# Patient Record
Sex: FEMALE
[De-identification: names, ages, dates, MRNs, and addresses within clinical notes are randomized per-mention and may not be internally consistent; named-entity substitution may affect disease eponyms.]

---

## 2017-05-23 ENCOUNTER — HOSPITAL ENCOUNTER (EMERGENCY)
Dept: HOSPITAL 5 - ED | Age: 42
LOS: 1 days | Discharge: HOME | End: 2017-05-24
Payer: MEDICARE
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
## 2017-05-23 DIAGNOSIS — F32.9: ICD-10-CM

## 2017-05-23 DIAGNOSIS — S96.912A: Primary | ICD-10-CM

## 2017-05-23 DIAGNOSIS — J45.909: ICD-10-CM

## 2017-05-23 DIAGNOSIS — F31.9: ICD-10-CM
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
## 2017-05-23 DIAGNOSIS — S80.02XA: ICD-10-CM
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
## 2017-05-23 LAB
ANION GAP SERPL CALC-SCNC: 17 MMOL/L
APTT BLD: 24.7 SEC. (ref 24.2–36.6)
BUN SERPL-MCNC: 16 MG/DL (ref 7–17)
BUN/CREAT SERPL: 22.85 %
CALCIUM SERPL-MCNC: 9.8 MG/DL (ref 8.4–10.2)
CHLORIDE SERPL-SCNC: 100.6 MMOL/L (ref 98–107)
CO2 SERPL-SCNC: 24 MMOL/L (ref 22–30)
GLUCOSE SERPL-MCNC: 95 MG/DL (ref 65–100)
HCT VFR BLD CALC: 40.9 % (ref 30.3–42.9)
HGB BLD-MCNC: 13.6 GM/DL (ref 10.1–14.3)
INR PPP: 1.01 (ref 0.87–1.13)
MCH RBC QN AUTO: 32 PG (ref 28–32)
MCHC RBC AUTO-ENTMCNC: 33 % (ref 30–34)
MCV RBC AUTO: 96 FL (ref 79–97)
PLATELET # BLD: 230 K/MM3 (ref 140–440)
POTASSIUM SERPL-SCNC: 4 MMOL/L (ref 3.6–5)
RBC # BLD AUTO: 4.27 M/MM3 (ref 3.65–5.03)
SODIUM SERPL-SCNC: 138 MMOL/L (ref 137–145)
WBC # BLD AUTO: 8 K/MM3 (ref 4.5–11)

## 2017-05-23 PROCEDURE — 36415 COLL VENOUS BLD VENIPUNCTURE: CPT

## 2017-05-23 PROCEDURE — 85730 THROMBOPLASTIN TIME PARTIAL: CPT

## 2017-05-23 PROCEDURE — 85610 PROTHROMBIN TIME: CPT

## 2017-05-23 PROCEDURE — 99284 EMERGENCY DEPT VISIT MOD MDM: CPT

## 2017-05-23 PROCEDURE — 84703 CHORIONIC GONADOTROPIN ASSAY: CPT

## 2017-05-23 PROCEDURE — 80048 BASIC METABOLIC PNL TOTAL CA: CPT

## 2017-05-23 PROCEDURE — 85027 COMPLETE CBC AUTOMATED: CPT

## 2017-05-24 VITALS — DIASTOLIC BLOOD PRESSURE: 52 MMHG | SYSTOLIC BLOOD PRESSURE: 120 MMHG

## 2017-09-25 ENCOUNTER — HOSPITAL ENCOUNTER (EMERGENCY)
Dept: HOSPITAL 5 - ED | Age: 42
LOS: 1 days | Discharge: HOME | End: 2017-09-26
Payer: MEDICARE

## 2017-09-25 DIAGNOSIS — Z88.5: ICD-10-CM

## 2017-09-25 DIAGNOSIS — B35.1: Primary | ICD-10-CM

## 2017-09-25 DIAGNOSIS — F31.9: ICD-10-CM

## 2017-09-25 DIAGNOSIS — J45.909: ICD-10-CM

## 2017-09-25 PROCEDURE — 99282 EMERGENCY DEPT VISIT SF MDM: CPT

## 2017-09-26 VITALS — DIASTOLIC BLOOD PRESSURE: 55 MMHG | SYSTOLIC BLOOD PRESSURE: 114 MMHG

## 2017-10-03 ENCOUNTER — HOSPITAL ENCOUNTER (EMERGENCY)
Dept: HOSPITAL 5 - ED | Age: 42
Discharge: HOME | End: 2017-10-03
Payer: MEDICARE

## 2017-10-03 VITALS — DIASTOLIC BLOOD PRESSURE: 75 MMHG | SYSTOLIC BLOOD PRESSURE: 107 MMHG
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
## 2017-10-03 DIAGNOSIS — S93.432A: ICD-10-CM

## 2017-10-03 DIAGNOSIS — S83.8X2A: Primary | ICD-10-CM
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
## 2017-10-13 ENCOUNTER — HOSPITAL ENCOUNTER (EMERGENCY)
Dept: HOSPITAL 5 - ED | Age: 42
LOS: 1 days | Discharge: HOME | End: 2017-10-14
Payer: MEDICARE

## 2017-10-13 DIAGNOSIS — R55: Primary | ICD-10-CM

## 2017-10-13 DIAGNOSIS — F31.9: ICD-10-CM

## 2017-10-13 DIAGNOSIS — F32.9: ICD-10-CM

## 2017-10-13 DIAGNOSIS — J45.909: ICD-10-CM

## 2017-10-13 DIAGNOSIS — Z88.5: ICD-10-CM

## 2017-10-13 DIAGNOSIS — G44.209: ICD-10-CM

## 2017-10-13 PROCEDURE — 93005 ELECTROCARDIOGRAM TRACING: CPT

## 2017-10-13 PROCEDURE — 93010 ELECTROCARDIOGRAM REPORT: CPT

## 2017-10-14 VITALS — DIASTOLIC BLOOD PRESSURE: 73 MMHG | SYSTOLIC BLOOD PRESSURE: 132 MMHG

## 2017-10-17 ENCOUNTER — HOSPITAL ENCOUNTER (EMERGENCY)
Dept: HOSPITAL 5 - ED | Age: 42
Discharge: HOME | End: 2017-10-17
Payer: MEDICARE

## 2017-10-17 VITALS — DIASTOLIC BLOOD PRESSURE: 63 MMHG | SYSTOLIC BLOOD PRESSURE: 111 MMHG

## 2017-10-17 DIAGNOSIS — Z98.890: ICD-10-CM

## 2017-10-17 DIAGNOSIS — M54.10: ICD-10-CM

## 2017-10-17 DIAGNOSIS — J45.909: ICD-10-CM

## 2017-10-17 DIAGNOSIS — G89.29: ICD-10-CM

## 2017-10-17 DIAGNOSIS — M25.511: Primary | ICD-10-CM

## 2017-10-17 PROCEDURE — 96372 THER/PROPH/DIAG INJ SC/IM: CPT

## 2017-10-17 PROCEDURE — 73090 X-RAY EXAM OF FOREARM: CPT

## 2017-10-17 PROCEDURE — 99283 EMERGENCY DEPT VISIT LOW MDM: CPT

## 2017-10-17 PROCEDURE — 73030 X-RAY EXAM OF SHOULDER: CPT

## 2017-10-17 RX ADMIN — KETOROLAC TROMETHAMINE ONE MG: 60 INJECTION, SOLUTION INTRAMUSCULAR at 11:21

## 2017-11-18 ENCOUNTER — HOSPITAL ENCOUNTER (EMERGENCY)
Dept: HOSPITAL 5 - ED | Age: 42
Discharge: HOME | End: 2017-11-18
Payer: MEDICARE

## 2017-11-18 VITALS — DIASTOLIC BLOOD PRESSURE: 73 MMHG | SYSTOLIC BLOOD PRESSURE: 114 MMHG

## 2017-11-18 DIAGNOSIS — Z88.6: ICD-10-CM

## 2017-11-18 DIAGNOSIS — S93.402A: Primary | ICD-10-CM
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
## 2017-11-18 PROCEDURE — 99284 EMERGENCY DEPT VISIT MOD MDM: CPT

## 2018-02-14 ENCOUNTER — HOSPITAL ENCOUNTER (EMERGENCY)
Dept: HOSPITAL 5 - ED | Age: 43
Discharge: LEFT BEFORE DISCHARGE | End: 2018-02-14
Payer: MEDICARE

## 2018-02-14 DIAGNOSIS — Z53.21: ICD-10-CM

## 2018-02-14 DIAGNOSIS — H92.09: Primary | ICD-10-CM

## 2018-02-17 ENCOUNTER — HOSPITAL ENCOUNTER (EMERGENCY)
Dept: HOSPITAL 5 - ED | Age: 43
Discharge: HOME | End: 2018-02-17
Payer: MEDICARE

## 2018-02-17 VITALS — DIASTOLIC BLOOD PRESSURE: 43 MMHG | SYSTOLIC BLOOD PRESSURE: 108 MMHG
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
## 2018-02-17 DIAGNOSIS — Z88.6: ICD-10-CM
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
## 2018-02-17 DIAGNOSIS — S93.402A: Primary | ICD-10-CM
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
## 2018-02-17 DIAGNOSIS — S83.92XA: ICD-10-CM

## 2018-02-17 PROCEDURE — 99283 EMERGENCY DEPT VISIT LOW MDM: CPT

## 2018-03-03 ENCOUNTER — HOSPITAL ENCOUNTER (EMERGENCY)
Dept: HOSPITAL 5 - ED | Age: 43
LOS: 1 days | Discharge: HOME | End: 2018-03-04
Payer: MEDICARE

## 2018-03-03 DIAGNOSIS — M25.531: Primary | ICD-10-CM

## 2018-03-03 PROCEDURE — 99284 EMERGENCY DEPT VISIT MOD MDM: CPT

## 2018-03-04 VITALS — SYSTOLIC BLOOD PRESSURE: 110 MMHG | DIASTOLIC BLOOD PRESSURE: 59 MMHG

## 2018-03-10 ENCOUNTER — HOSPITAL ENCOUNTER (EMERGENCY)
Dept: HOSPITAL 5 - ED | Age: 43
Discharge: HOME | End: 2018-03-10
Payer: MEDICARE

## 2018-03-10 VITALS — SYSTOLIC BLOOD PRESSURE: 110 MMHG | DIASTOLIC BLOOD PRESSURE: 65 MMHG
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
## 2018-03-10 DIAGNOSIS — Z88.6: ICD-10-CM
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
## 2018-03-10 DIAGNOSIS — S93.402A: Primary | ICD-10-CM

## 2018-03-10 LAB
ALBUMIN SERPL-MCNC: 3.8 G/DL (ref 3.9–5)
ALT SERPL-CCNC: 15 UNITS/L (ref 7–56)
BASOPHILS # (AUTO): 0.1 K/MM3 (ref 0–0.1)
BASOPHILS NFR BLD AUTO: 0.8 % (ref 0–1.8)
BUN SERPL-MCNC: 12 MG/DL (ref 7–17)
BUN/CREAT SERPL: 17 %
CALCIUM SERPL-MCNC: 9.6 MG/DL (ref 8.4–10.2)
EOSINOPHIL # BLD AUTO: 0.2 K/MM3 (ref 0–0.4)
EOSINOPHIL NFR BLD AUTO: 2.8 % (ref 0–4.3)
HCT VFR BLD CALC: 39.1 % (ref 30.3–42.9)
HEMOLYSIS INDEX: 4
HGB BLD-MCNC: 13.4 GM/DL (ref 10.1–14.3)
LIPASE SERPL-CCNC: 42 UNITS/L (ref 13–60)
LYMPHOCYTES # BLD AUTO: 2.5 K/MM3 (ref 1.2–5.4)
LYMPHOCYTES NFR BLD AUTO: 35.8 % (ref 13.4–35)
MCH RBC QN AUTO: 32 PG (ref 28–32)
MCHC RBC AUTO-ENTMCNC: 34 % (ref 30–34)
MCV RBC AUTO: 95 FL (ref 79–97)
MONOCYTES # (AUTO): 0.3 K/MM3 (ref 0–0.8)
MONOCYTES % (AUTO): 4.2 % (ref 0–7.3)
PLATELET # BLD: 217 K/MM3 (ref 140–440)
RBC # BLD AUTO: 4.14 M/MM3 (ref 3.65–5.03)

## 2018-03-10 PROCEDURE — 82150 ASSAY OF AMYLASE: CPT

## 2018-03-10 PROCEDURE — 36415 COLL VENOUS BLD VENIPUNCTURE: CPT

## 2018-03-10 PROCEDURE — 80053 COMPREHEN METABOLIC PANEL: CPT

## 2018-03-10 PROCEDURE — 83690 ASSAY OF LIPASE: CPT

## 2018-03-10 PROCEDURE — 85025 COMPLETE CBC W/AUTO DIFF WBC: CPT

## 2018-03-10 PROCEDURE — 99283 EMERGENCY DEPT VISIT LOW MDM: CPT

## 2018-03-10 PROCEDURE — 96372 THER/PROPH/DIAG INJ SC/IM: CPT

## 2018-03-16 ENCOUNTER — HOSPITAL ENCOUNTER (EMERGENCY)
Dept: HOSPITAL 5 - ED | Age: 43
Discharge: HOME | End: 2018-03-16
Payer: MEDICARE

## 2018-03-16 VITALS — SYSTOLIC BLOOD PRESSURE: 119 MMHG | DIASTOLIC BLOOD PRESSURE: 73 MMHG

## 2018-03-16 DIAGNOSIS — M25.511: Primary | ICD-10-CM

## 2018-03-16 PROCEDURE — 99283 EMERGENCY DEPT VISIT LOW MDM: CPT

## 2018-03-19 ENCOUNTER — HOSPITAL ENCOUNTER (EMERGENCY)
Dept: HOSPITAL 5 - ED | Age: 43
Discharge: HOME | End: 2018-03-19
Payer: MEDICARE

## 2018-03-19 VITALS — DIASTOLIC BLOOD PRESSURE: 69 MMHG | SYSTOLIC BLOOD PRESSURE: 128 MMHG

## 2018-03-19 DIAGNOSIS — G43.909: Primary | ICD-10-CM

## 2018-03-19 DIAGNOSIS — Z88.6: ICD-10-CM

## 2018-03-19 PROCEDURE — 96375 TX/PRO/DX INJ NEW DRUG ADDON: CPT

## 2018-03-19 PROCEDURE — 99283 EMERGENCY DEPT VISIT LOW MDM: CPT

## 2018-03-19 PROCEDURE — 96374 THER/PROPH/DIAG INJ IV PUSH: CPT

## 2018-03-19 PROCEDURE — 96361 HYDRATE IV INFUSION ADD-ON: CPT

## 2018-03-20 ENCOUNTER — HOSPITAL ENCOUNTER (EMERGENCY)
Dept: HOSPITAL 5 - ED | Age: 43
Discharge: LEFT BEFORE DISCHARGE | End: 2018-03-20
Payer: MEDICARE

## 2018-03-20 VITALS — DIASTOLIC BLOOD PRESSURE: 52 MMHG | SYSTOLIC BLOOD PRESSURE: 116 MMHG

## 2018-03-20 DIAGNOSIS — J02.9: Primary | ICD-10-CM

## 2018-03-20 DIAGNOSIS — M79.606: ICD-10-CM

## 2018-03-20 DIAGNOSIS — Z53.21: ICD-10-CM

## 2018-03-28 ENCOUNTER — HOSPITAL ENCOUNTER (EMERGENCY)
Dept: HOSPITAL 5 - ED | Age: 43
LOS: 1 days | Discharge: HOME | End: 2018-03-29
Payer: MEDICARE

## 2018-03-28 VITALS — DIASTOLIC BLOOD PRESSURE: 82 MMHG | SYSTOLIC BLOOD PRESSURE: 126 MMHG
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
## 2018-03-28 DIAGNOSIS — Z88.6: ICD-10-CM

## 2018-03-28 DIAGNOSIS — S80.02XA: Primary | ICD-10-CM
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
## 2018-03-28 PROCEDURE — 99283 EMERGENCY DEPT VISIT LOW MDM: CPT

## 2018-04-07 ENCOUNTER — HOSPITAL ENCOUNTER (EMERGENCY)
Dept: HOSPITAL 5 - ED | Age: 43
Discharge: LEFT BEFORE DISCHARGE | End: 2018-04-07
Payer: MEDICARE

## 2018-04-07 VITALS — DIASTOLIC BLOOD PRESSURE: 65 MMHG | SYSTOLIC BLOOD PRESSURE: 110 MMHG

## 2018-04-07 DIAGNOSIS — Z53.21: ICD-10-CM

## 2018-04-07 DIAGNOSIS — M79.602: Primary | ICD-10-CM

## 2018-04-07 DIAGNOSIS — M79.605: ICD-10-CM

## 2018-04-07 PROCEDURE — 93010 ELECTROCARDIOGRAM REPORT: CPT

## 2018-04-07 PROCEDURE — 93005 ELECTROCARDIOGRAM TRACING: CPT

## 2018-04-25 ENCOUNTER — HOSPITAL ENCOUNTER (EMERGENCY)
Dept: HOSPITAL 5 - ED | Age: 43
Discharge: HOME | End: 2018-04-25
Payer: MEDICARE

## 2018-04-25 VITALS — DIASTOLIC BLOOD PRESSURE: 50 MMHG | SYSTOLIC BLOOD PRESSURE: 121 MMHG

## 2018-04-25 DIAGNOSIS — Z90.721: ICD-10-CM
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
## 2018-04-25 DIAGNOSIS — F31.9: ICD-10-CM

## 2018-04-25 DIAGNOSIS — Z88.5: ICD-10-CM

## 2018-04-25 DIAGNOSIS — Z90.49: ICD-10-CM

## 2018-04-25 DIAGNOSIS — F43.10: ICD-10-CM

## 2018-04-25 DIAGNOSIS — M25.572: ICD-10-CM

## 2018-04-25 DIAGNOSIS — M25.562: Primary | ICD-10-CM

## 2018-04-25 DIAGNOSIS — G89.29: ICD-10-CM

## 2018-04-25 PROCEDURE — 99283 EMERGENCY DEPT VISIT LOW MDM: CPT

## 2018-05-05 ENCOUNTER — HOSPITAL ENCOUNTER (EMERGENCY)
Dept: HOSPITAL 5 - ED | Age: 43
Discharge: HOME | End: 2018-05-05
Payer: MEDICARE

## 2018-05-05 VITALS — SYSTOLIC BLOOD PRESSURE: 112 MMHG | DIASTOLIC BLOOD PRESSURE: 72 MMHG
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
## 2018-05-05 DIAGNOSIS — G89.29: ICD-10-CM

## 2018-05-05 DIAGNOSIS — Z88.8: ICD-10-CM
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
## 2018-05-05 DIAGNOSIS — M25.562: ICD-10-CM

## 2018-05-05 DIAGNOSIS — M79.601: Primary | ICD-10-CM
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
## 2018-05-05 PROCEDURE — 99283 EMERGENCY DEPT VISIT LOW MDM: CPT

## 2018-05-05 NOTE — XRAY REPORT
FINAL REPORT



EXAM:  XR TIBIA FIBULA 2V LT



HISTORY:  left leg pain 



TECHNIQUE:  



PRIORS:  None.



FINDINGS:  

No fracture is identified. The joint spaces are within normal

limits. No focal bony lesion identified. No radiopaque foreign

body seen. 



IMPRESSION:  

Negative no acute abnormality.

## 2018-05-05 NOTE — XRAY REPORT
FINAL REPORT



EXAM:  XR HUMERUS 2+V RT



HISTORY:  Right arm pain 



TECHNIQUE:  Right humerus AP and lateral views 



PRIORS:  None.



FINDINGS:  

No fracture is identified. The joint spaces are within normal

limits. No focal bony lesion identified. No radiopaque foreign

body seen. 



IMPRESSION:  

Negative no acute abnormality.

## 2018-05-05 NOTE — EMERGENCY DEPARTMENT REPORT
ED General Adult HPI





- General


Chief complaint: Extremity Injury, Lower


Stated complaint: FALL


Time Seen by Provider: 05/05/18 17:22


Source: patient


Mode of arrival: Ambulatory


Limitations: No Limitations





- History of Present Illness


Initial comments: 





42-year-old  female presents the emergency department with the 

complaint of pain to the entire right arm and to the left leg from the knee 

down to the ankle.  This occurred after the patient fell down some stairs by 

the quick trip.  She denies hitting her head or any loss of consciousness.  She 

is not taking anything for her symptoms prior to presentation.  She has some 

trouble bearing weight to the left leg secondary to pain in the ankle and knee.





Severity scale (0 -10): 4





- Related Data


 Previous Rx's











 Medication  Instructions  Recorded  Last Taken  Type


 


Ibuprofen [Motrin 800 MG tab] 800 mg PO Q8HR PRN #90 tablet 05/05/18 Unknown Rx











 Allergies











Allergy/AdvReac Type Severity Reaction Status Date / Time


 


codeine Allergy  Nausea Verified 05/05/18 20:10














ED Review of Systems


ROS: 


Stated complaint: FALL


Other details as noted in HPI








ED Past Medical Hx





- Past Medical History


Previous Medical History?: No





- Social History


Smoking Status: Never Smoker





- Medications


Home Medications: 


 Home Medications











 Medication  Instructions  Recorded  Confirmed  Last Taken  Type


 


Ibuprofen [Motrin 800 MG tab] 800 mg PO Q8HR PRN #90 tablet 05/05/18  Unknown Rx














ED Physical Exam





- General


Limitations: No Limitations


General appearance: alert, in no apparent distress





- Head


Head exam: Present: atraumatic, normocephalic





- Eye


Eye exam: Present: normal appearance





- ENT


ENT exam: Present: mucous membranes moist





- Neck


Neck exam: Present: normal inspection





- Respiratory


Respiratory exam: Present: normal lung sounds bilaterally.  Absent: respiratory 

distress





- Cardiovascular


Cardiovascular Exam: Present: regular rate, normal rhythm.  Absent: systolic 

murmur, diastolic murmur, rubs, gallop





- GI/Abdominal


GI/Abdominal exam: Present: soft, normal bowel sounds





- Extremities Exam


Extremities exam: Present: normal inspection





- Back Exam


Back exam: Present: normal inspection





- Neurological Exam


Neurological exam: Present: alert, oriented X3





- Psychiatric


Psychiatric exam: Present: normal affect, normal mood





- Skin


Skin exam: Present: warm, dry, intact, normal color.  Absent: rash





ED Course


 Vital Signs











  05/05/18





  15:34


 


Temperature 98 F


 


Pulse Rate 85


 


Respiratory 16





Rate 


 


Blood Pressure 110/71


 


O2 Sat by Pulse 96





Oximetry 














ED Medical Decision Making





- Radiology Data


Radiology results: report reviewed, image reviewed





FINAL REPORT 





EXAM: XR TIBIA FIBULA 2V LT 





HISTORY: left leg pain 





TECHNIQUE: 





PRIORS: None. 





FINDINGS: 


No fracture is identified. The joint spaces are within normal 


limits. No focal bony lesion identified. No radiopaque foreign 


body seen. 





IMPRESSION: 


Negative no acute abnormality. 











Transcribed By: SHEREE 


Dictated By: NAHUN GEIGER MD 


Electronically Authenticated By: NAHUN GEIGER MD 


Signed Date/Time: 05/05/18 1928 











DD/DT: 05/05/18 1928 


TD/TT: 05/05/18 1928





FINAL REPORT 





EXAM: XR KNEE 3V LT 





HISTORY: left knee pain 





TECHNIQUE: Left tibia-fibula AP and lateral views 





PRIORS: None. 





FINDINGS: 


No fracture is identified. The joint spaces are within normal 


limits. No focal bony lesion identified. No radiopaque foreign 


body seen. 





IMPRESSION: 


Negative no acute abnormality. 











Transcribed By: SHEREE 


Dictated By: NAHUN GEIGER MD 


Electronically Authenticated By: NAHUN GEIGER MD 


Signed Date/Time: 05/05/18 1931 











DD/DT: 05/05/18 1931 


TD/TT: 05/05/18 1931











FINAL REPORT 





EXAM: XR HUMERUS 2+V RT 





HISTORY: Right arm pain 





TECHNIQUE: Right humerus AP and lateral views 





PRIORS: None. 





FINDINGS: 


No fracture is identified. The joint spaces are within normal 


limits. No focal bony lesion identified. No radiopaque foreign 


body seen. 





IMPRESSION: 


Negative no acute abnormality. 











Transcribed By: SHEREE 


Dictated By: NAHUN GEIGER MD 


Electronically Authenticated By: NAHUN GEIGER MD 


Signed Date/Time: 05/05/18 1941 











DD/DT: 05/05/18 1941 


TD/TT: 05/05/18 1941


Critical care attestation.: 


If time is entered above; I have spent that time in minutes in the direct care 

of this critically ill patient, excluding procedure time.








ED Disposition


Clinical Impression: 


 Right arm pain





Chronic knee pain


Qualifiers:


 Laterality: left Qualified Code(s): M25.562 - Pain in left knee





Disposition: DC-01 TO HOME OR SELFCARE


Is pt being admited?: No


Does the pt Need Aspirin: No


Condition: Stable


Additional Instructions: 


Please take pain medication as prescribed.  It is important for you to follow 

up with a regular primary care provider to manage her chronic pain.


Prescriptions: 


Ibuprofen [Motrin 800 MG tab] 800 mg PO Q8HR PRN #90 tablet


 PRN Reason: Pain


Referrals: 


PRIMARY CARE,MD [Primary Care Provider] - 3-5 Days


[facility] [Provider Group] - 3-5 Days

## 2018-05-05 NOTE — XRAY REPORT
FINAL REPORT



EXAM:  XR FOREARM RT



HISTORY:  right forearm pain 



TECHNIQUE:  Right forearm two views 



PRIORS:  None.



FINDINGS:  

No fracture is identified. The joint spaces are within normal

limits. No focal bony lesion identified. No radiopaque foreign

body seen. 



IMPRESSION:  

Negative no acute abnormality.

## 2018-05-05 NOTE — XRAY REPORT
FINAL REPORT



EXAM:  XR KNEE 3V LT



HISTORY:  left knee pain 



TECHNIQUE:  Left tibia-fibula AP and lateral views 



PRIORS:  None.



FINDINGS:  

No fracture is identified. The joint spaces are within normal

limits. No focal bony lesion identified. No radiopaque foreign

body seen. 



IMPRESSION:  

Negative no acute abnormality.

## 2018-05-05 NOTE — EMERGENCY DEPARTMENT REPORT
Chief Complaint: Extremity Injury, Lower


Stated Complaint: FALL


Time Seen by Provider: 05/05/18 17:22





- HPI


History of Present Illness: 





42-year-old  female presents the emergency department with the 

complaint of pain to the entire right arm and to the left leg from the knee 

down to the ankle.  This occurred after the patient fell down some stairs by 

the quick trip.  She denies hitting her head or any loss of consciousness.  She 

is not taking anything for her symptoms prior to presentation.  She has some 

trouble bearing weight to the left leg secondary to pain in the ankle and knee.





- ROS


Review of Systems: 


Positive for right arm and left leg pain.  


Negative for headache, chest pain, shortness of breath, back pain, neck pain








- Exam


Vital Signs: 


 Vital Signs











  05/05/18





  15:34


 


Temperature 98 F


 


Pulse Rate 85


 


Respiratory 16





Rate 


 


Blood Pressure 110/71


 


O2 Sat by Pulse 96





Oximetry 











Physical Exam: 


Heart and lungs sounds are normal to auscultation.  Patient is awake and alert.

  She has some tenderness to palpation along the right arm from the shoulder to 

the wrist and along the left leg from the knee down to the ankle.  Negative 

anterior and posterior drawer test.  No laxity with valgus or varus stress.





MSE screening note: 


Focused history and physical exam performed.


Due to findings the following was ordered:





I have ordered x-rays of the right humerus, right forearm, left knee and left 

tib-fib.





ED Disposition for MSE


Condition: Stable


Referrals: 


PRIMARY CARE,MD [Primary Care Provider] - 3-5 Days

## 2018-05-08 ENCOUNTER — HOSPITAL ENCOUNTER (EMERGENCY)
Dept: HOSPITAL 5 - ED | Age: 43
Discharge: HOME | End: 2018-05-08
Payer: MEDICARE

## 2018-05-08 VITALS — SYSTOLIC BLOOD PRESSURE: 126 MMHG | DIASTOLIC BLOOD PRESSURE: 74 MMHG

## 2018-05-08 DIAGNOSIS — M25.562: ICD-10-CM

## 2018-05-08 DIAGNOSIS — Z90.49: ICD-10-CM

## 2018-05-08 DIAGNOSIS — N39.0: ICD-10-CM

## 2018-05-08 DIAGNOSIS — Z90.721: ICD-10-CM

## 2018-05-08 DIAGNOSIS — J45.909: ICD-10-CM

## 2018-05-08 DIAGNOSIS — Z88.5: ICD-10-CM

## 2018-05-08 DIAGNOSIS — G89.29: ICD-10-CM

## 2018-05-08 DIAGNOSIS — Z85.43: ICD-10-CM

## 2018-05-08 DIAGNOSIS — F31.9: ICD-10-CM

## 2018-05-08 DIAGNOSIS — F43.10: ICD-10-CM

## 2018-05-08 DIAGNOSIS — K52.9: Primary | ICD-10-CM

## 2018-05-08 LAB
ALBUMIN SERPL-MCNC: 3.8 G/DL (ref 3.9–5)
ALT SERPL-CCNC: 17 UNITS/L (ref 7–56)
BACTERIA #/AREA URNS HPF: (no result) /HPF
BASOPHILS # (AUTO): 0.1 K/MM3 (ref 0–0.1)
BASOPHILS NFR BLD AUTO: 0.9 % (ref 0–1.8)
BILIRUB UR QL STRIP: (no result)
BLOOD UR QL VISUAL: (no result)
BUN SERPL-MCNC: 13 MG/DL (ref 7–17)
BUN/CREAT SERPL: 22 %
CALCIUM SERPL-MCNC: 9.6 MG/DL (ref 8.4–10.2)
EOSINOPHIL # BLD AUTO: 0.2 K/MM3 (ref 0–0.4)
EOSINOPHIL NFR BLD AUTO: 2.2 % (ref 0–4.3)
HCT VFR BLD CALC: 38.5 % (ref 30.3–42.9)
HEMOLYSIS INDEX: 6
HGB BLD-MCNC: 12.5 GM/DL (ref 10.1–14.3)
LIPASE SERPL-CCNC: 32 UNITS/L (ref 13–60)
LYMPHOCYTES # BLD AUTO: 2.5 K/MM3 (ref 1.2–5.4)
LYMPHOCYTES NFR BLD AUTO: 31.6 % (ref 13.4–35)
MCH RBC QN AUTO: 32 PG (ref 28–32)
MCHC RBC AUTO-ENTMCNC: 33 % (ref 30–34)
MCV RBC AUTO: 98 FL (ref 79–97)
MONOCYTES # (AUTO): 0.4 K/MM3 (ref 0–0.8)
MONOCYTES % (AUTO): 5.4 % (ref 0–7.3)
MUCOUS THREADS #/AREA URNS HPF: (no result) /HPF
PH UR STRIP: 5 [PH] (ref 5–7)
PLATELET # BLD: 261 K/MM3 (ref 140–440)
PROT UR STRIP-MCNC: (no result) MG/DL
RBC # BLD AUTO: 3.94 M/MM3 (ref 3.65–5.03)
RBC #/AREA URNS HPF: 7 /HPF (ref 0–6)
UROBILINOGEN UR-MCNC: 2 MG/DL (ref ?–2)
WBC #/AREA URNS HPF: 6 /HPF (ref 0–6)

## 2018-05-08 PROCEDURE — 81001 URINALYSIS AUTO W/SCOPE: CPT

## 2018-05-08 PROCEDURE — 99283 EMERGENCY DEPT VISIT LOW MDM: CPT

## 2018-05-08 PROCEDURE — 80053 COMPREHEN METABOLIC PANEL: CPT

## 2018-05-08 PROCEDURE — 81025 URINE PREGNANCY TEST: CPT

## 2018-05-08 PROCEDURE — 96372 THER/PROPH/DIAG INJ SC/IM: CPT

## 2018-05-08 PROCEDURE — 85025 COMPLETE CBC W/AUTO DIFF WBC: CPT

## 2018-05-08 PROCEDURE — 36415 COLL VENOUS BLD VENIPUNCTURE: CPT

## 2018-05-08 PROCEDURE — 83690 ASSAY OF LIPASE: CPT

## 2018-05-08 PROCEDURE — 87086 URINE CULTURE/COLONY COUNT: CPT

## 2018-05-10 ENCOUNTER — HOSPITAL ENCOUNTER (EMERGENCY)
Dept: HOSPITAL 5 - ED | Age: 43
LOS: 1 days | Discharge: HOME | End: 2018-05-11
Payer: MEDICARE

## 2018-05-10 DIAGNOSIS — G89.29: ICD-10-CM

## 2018-05-10 DIAGNOSIS — M25.572: Primary | ICD-10-CM

## 2018-05-10 DIAGNOSIS — J45.909: ICD-10-CM

## 2018-05-11 VITALS — DIASTOLIC BLOOD PRESSURE: 76 MMHG | SYSTOLIC BLOOD PRESSURE: 120 MMHG

## 2018-05-23 ENCOUNTER — HOSPITAL ENCOUNTER (EMERGENCY)
Dept: HOSPITAL 5 - ED | Age: 43
Discharge: LEFT BEFORE DISCHARGE | End: 2018-05-23
Payer: MEDICARE

## 2018-05-23 DIAGNOSIS — M25.572: Primary | ICD-10-CM

## 2018-05-23 DIAGNOSIS — Z53.21: ICD-10-CM

## 2018-05-25 ENCOUNTER — HOSPITAL ENCOUNTER (EMERGENCY)
Dept: HOSPITAL 5 - ED | Age: 43
LOS: 1 days | Discharge: HOME | End: 2018-05-26
Payer: MEDICARE

## 2018-05-25 VITALS — SYSTOLIC BLOOD PRESSURE: 118 MMHG | DIASTOLIC BLOOD PRESSURE: 62 MMHG

## 2018-05-25 DIAGNOSIS — M25.562: Primary | ICD-10-CM

## 2018-05-25 DIAGNOSIS — M25.572: ICD-10-CM

## 2018-05-25 DIAGNOSIS — Z88.6: ICD-10-CM

## 2018-05-25 PROCEDURE — 99284 EMERGENCY DEPT VISIT MOD MDM: CPT

## 2018-05-25 PROCEDURE — 81025 URINE PREGNANCY TEST: CPT

## 2018-06-18 ENCOUNTER — HOSPITAL ENCOUNTER (EMERGENCY)
Dept: HOSPITAL 5 - ED | Age: 43
Discharge: HOME | End: 2018-06-18
Payer: MEDICARE

## 2018-06-18 VITALS — DIASTOLIC BLOOD PRESSURE: 61 MMHG | SYSTOLIC BLOOD PRESSURE: 114 MMHG

## 2018-06-18 DIAGNOSIS — Z88.5: ICD-10-CM
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
## 2018-06-18 DIAGNOSIS — S93.402A: ICD-10-CM
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
## 2018-06-18 DIAGNOSIS — S86.912A: Primary | ICD-10-CM
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
## 2018-06-18 NOTE — XRAY REPORT
FINAL REPORT



EXAM:  XR ANKLE 2V LT



HISTORY:  ankle pain on the left



TECHNIQUE:  AP, lateral, and oblique views of the left ankle



PRIORS:  None.



FINDINGS:  

There is no evidence for acute fracture or dislocation.

Generalized soft tissue swelling is seen. No radiopaque foreign

bodies are seen.  The ankle mortise is intact.  Bony

mineralization is normal and joint spaces are maintained. Small

spur off the posterior calcaneus is noted. 



IMPRESSION:  

No acute bony abnormality noted. Generalized soft tissue

swelling.

## 2018-06-18 NOTE — XRAY REPORT
FINAL REPORT



EXAM:  XR KNEE 3V LT



HISTORY:  knee pain 



TECHNIQUE:  AP, oblique, and lateral views of the left knee 



PRIORS:  X-rays left knee 05/05/2018



FINDINGS:  

No acute fracture or dislocation is seen.  The soft tissues are

unremarkable with no evidence for suprapatellar joint effusion.

Joint spaces are maintained and bony mineralization is normal. 



IMPRESSION:  

Negative views of the left knee. No change.

## 2018-06-18 NOTE — EMERGENCY DEPARTMENT REPORT
ED Lower Extremity HPI





- General


Chief Complaint: Extremity Problem,Nontraumatic


Stated Complaint: left ankle pain


Time Seen by Provider: 06/18/18 20:22


Source: patient


Mode of arrival: Wheelchair


Limitations: No Limitations





- History of Present Illness


Initial Comments: 





This is a 42-year-old female nontoxic, well nourished in appearance, no acute 

signs of distress presents to the ED with c/o of left knee and ankle pain 1 

day.  Patient stated that yesterday and twisted her ankle and fell on her knee.

  Patient denies any other trauma.  Patient denies any numbness, tingling, fever

, chills, nausea, vomiting, chest pain, shortness of breath, headache, stiff 

neck.  Patient denies any joint swelling or joint redness.  Patient denies 

decreased range of motion.  Patient stated has decreased gait due to pain.  

Patient stated allergies to codeine.  Denies significant PMH.


MD Complaint: knee injury, ankle injury


-: days(s) (1)


Injury: Knee: Left, Ankle: Left


Type of Injury: blunt


Place: home


Severity: mild


Severity scale (0 -10): 8


Improves With: immobilization


Worsens With: weight bearing, movement, palpation


Associated Symptoms: swelling, able to partially bear weight, ambulatory.  

denies: snap/pop sensation, numbness, tingling, unable to bear weight





- Related Data


 Previous Rx's











 Medication  Instructions  Recorded  Last Taken  Type


 


Ibuprofen [Motrin 800 MG tab] 800 mg PO Q8HR PRN #90 tablet 05/05/18 Unknown Rx


 


Ibuprofen [Motrin] 600 mg PO Q8H PRN #30 tablet 06/18/18 Unknown Rx











 Allergies











Allergy/AdvReac Type Severity Reaction Status Date / Time


 


codeine Allergy  Nausea Verified 05/05/18 20:10














ED Review of Systems


ROS: 


Stated complaint: left ankle pain


Other details as noted in HPI





Constitutional: denies: chills, fever


Eyes: denies: eye pain, eye discharge, vision change


ENT: denies: ear pain, throat pain


Respiratory: denies: cough, shortness of breath, wheezing


Cardiovascular: denies: chest pain, palpitations


Endocrine: no symptoms reported


Gastrointestinal: denies: abdominal pain, nausea, diarrhea


Genitourinary: denies: urgency, dysuria, discharge


Musculoskeletal: arthralgia.  denies: back pain, joint swelling


Skin: denies: rash, lesions


Neurological: denies: headache, weakness, paresthesias


Psychiatric: denies: anxiety, depression


Hematological/Lymphatic: denies: easy bleeding, easy bruising





ED Past Medical Hx





- Past Medical History


Previous Medical History?: No





- Surgical History


Past Surgical History?: Yes


Additional Surgical History: knee





- Social History


Smoking Status: Never Smoker


Substance Use Type: None





- Medications


Home Medications: 


 Home Medications











 Medication  Instructions  Recorded  Confirmed  Last Taken  Type


 


Ibuprofen [Motrin 800 MG tab] 800 mg PO Q8HR PRN #90 tablet 05/05/18  Unknown Rx


 


Ibuprofen [Motrin] 600 mg PO Q8H PRN #30 tablet 06/18/18  Unknown Rx














ED Physical Exam





- General


Limitations: No Limitations


General appearance: alert, in no apparent distress





- Head


Head exam: Present: atraumatic, normocephalic





- Eye


Eye exam: Present: normal appearance


Pupils: Present: normal accommodation





- ENT


ENT exam: Present: normal exam, mucous membranes moist





- Neck


Neck exam: Present: normal inspection, full ROM.  Absent: tenderness, 

meningismus, lymphadenopathy





- Respiratory


Respiratory exam: Present: normal lung sounds bilaterally.  Absent: respiratory 

distress, wheezes, rales, rhonchi, stridor, chest wall tenderness, accessory 

muscle use, decreased breath sounds, prolonged expiratory





- Cardiovascular


Cardiovascular Exam: Present: regular rate, normal rhythm, normal heart sounds.

  Absent: irregular rhythm, systolic murmur, diastolic murmur, rubs, gallop





- GI/Abdominal


GI/Abdominal exam: Present: soft, normal bowel sounds





- Extremities Exam


Extremities exam: Present: normal inspection, full ROM, tenderness, normal 

capillary refill.  Absent: joint swelling, calf tenderness





- Expanded Lower Extremity Exam


  ** Left


Hip exam: Present: normal inspection, full ROM.  Absent: tenderness, swelling


Upper Leg exam: Present: normal inspection, full ROM.  Absent: tenderness, 

swelling


Knee exam: Present: normal inspection, full ROM, tenderness, swelling, full 

knee extension.  Absent: abrasion, laceration, ecchymosis, deformity, crepidus, 

dislocation, erythema, effusion, pain w/ pronation/supination, posterior draw 

sign, pain/laxity with valgus, pain/laxity with varus


Lower Leg exam: Present: normal inspection, full ROM.  Absent: tenderness, 

swelling, abrasion


Ankle exam: Present: normal inspection, full ROM, tenderness, swelling.  Absent

: abrasion, laceration, ecchymosis, deformity, crepidus, dislocation, erythema, 

anterior draw sign


Foot/Toe exam: Present: normal inspection, full ROM.  Absent: tenderness, 

swelling


Neuro vascular tendon exam: Present: no vascular compromise.  Absent: pulse 

deficit, abnormal cap refill, motor deficit, sensory deficit, tendon deficit, 

extremity cold to touch, pallor, abnormal 2-point discrimination, decreased fine

/light touch, foot drop, peroneal nerve deficit, significant pain with passive 

ROM of distal joint


Gait: Positive: observed and limited by pain





- Back Exam


Back exam: Present: normal inspection, full ROM.  Absent: tenderness, CVA 

tenderness (R), CVA tenderness (L), muscle spasm, paraspinal tenderness, 

vertebral tenderness, rash noted





- Neurological Exam


Neurological exam: Present: alert, oriented X3, normal gait





- Psychiatric


Psychiatric exam: Present: normal affect, normal mood





- Skin


Skin exam: Present: warm, dry, intact, normal color.  Absent: rash





ED Course





 Vital Signs











  06/18/18 06/18/18





  18:33 21:31


 


Temperature 97.6 F 98.6 F


 


Pulse Rate 57 L 71


 


Respiratory 16 16





Rate  


 


Blood Pressure 125/67 


 


Blood Pressure  114/61





[Left]  


 


O2 Sat by Pulse 100 98





Oximetry  














- Reevaluation(s)


Reevaluation #1: 





06/18/18 21:58


Patient is speaking in full sentences with no signs of distress noted.





ED Lower Extremity MDM





- Medical Decision Making





This is a 42-year-old female that presents with left knee and ankle sprain.  

Patient is stable and was examined by me.  I referred patient to an orthopedic 

doctor for further evaluation for possible MRI.  X-ray has been obtained and 

dictated by the radiologist.  Patient is notified of the x-ray report with 

noted by the patient.  Patient does have normal gait with no tenderness and no 

joint swelling.  No ecchymosis. no joint redness or swelling. Not warm to 

touch. No signs of cellulites present. Patient received a knee immobilizer and 

crutches and was educated by RN how to use crutches.  Patient was instructed to 

RICE therapy.  Patient received Motrin for pain.  Patient is discharged with 

Motrin. At time of discharge, the patient does not seem toxic or ill in 

appearance.  No acute signs of distress noted.  Patient agrees to discharge 

treatment plan of care.  No further questions noted by the patient.


Critical care attestation.: 


If time is entered above; I have spent that time in minutes in the direct care 

of this critically ill patient, excluding procedure time.








ED Disposition


Clinical Impression: 


Strain of left knee


Qualifiers:


 Encounter type: initial encounter Qualified Code(s): S86.912A - Strain of 

unspecified muscle(s) and tendon(s) at lower leg level, left leg, initial 

encounter





Left ankle sprain


Qualifiers:


 Encounter type: initial encounter Involved ligament of ankle: unspecified 

ligament Qualified Code(s): S93.402A - Sprain of unspecified ligament of left 

ankle, initial encounter





Disposition: DC-01 TO HOME OR SELFCARE


Is pt being admited?: No


Does the pt Need Aspirin: No


Condition: Stable


Instructions:  Knee Pain (ED), Knee Immobilizer (ED), Ankle Sprain (ED), Crutch 

Instructions (ED), RICE Therapy (ED), Ibuprofen (By mouth)


Additional Instructions: 


Follow-up with a orthopedic doctor in 3-5 days or if symptoms worsen and 

continue return to emergency room as soon as possible. 


Rest, elevate, and ice extremity.


Prescriptions: 


Ibuprofen [Motrin] 600 mg PO Q8H PRN #30 tablet


 PRN Reason: Pain


Referrals: 


PRIMARY CARE,MD [Primary Care Provider] - 3-5 Days


ROSSANA DENTON MD [Staff Physician] - 3-5 Days


[facility] [Outside] - 3-5 Days


[facility] [Outside] - 3-5 Days


Forms:  Work/School Release Form(ED)

## 2018-06-21 ENCOUNTER — HOSPITAL ENCOUNTER (EMERGENCY)
Dept: HOSPITAL 5 - ED | Age: 43
Discharge: LEFT BEFORE DISCHARGE | End: 2018-06-21
Payer: MEDICARE

## 2018-06-21 VITALS — SYSTOLIC BLOOD PRESSURE: 112 MMHG | DIASTOLIC BLOOD PRESSURE: 65 MMHG

## 2018-06-21 DIAGNOSIS — R07.9: Primary | ICD-10-CM

## 2018-06-21 DIAGNOSIS — Z53.21: ICD-10-CM

## 2018-06-21 LAB
BASOPHILS # (AUTO): 0 K/MM3 (ref 0–0.1)
BASOPHILS NFR BLD AUTO: 0.6 % (ref 0–1.8)
BILIRUB UR QL STRIP: (no result)
BLOOD UR QL VISUAL: (no result)
BUN SERPL-MCNC: 9 MG/DL (ref 7–17)
BUN/CREAT SERPL: 15 %
CALCIUM SERPL-MCNC: 9.5 MG/DL (ref 8.4–10.2)
EOSINOPHIL # BLD AUTO: 0.1 K/MM3 (ref 0–0.4)
EOSINOPHIL NFR BLD AUTO: 2.3 % (ref 0–4.3)
HCT VFR BLD CALC: 39.6 % (ref 30.3–42.9)
HEMOLYSIS INDEX: 33
HGB BLD-MCNC: 13.2 GM/DL (ref 10.1–14.3)
LYMPHOCYTES # BLD AUTO: 1.7 K/MM3 (ref 1.2–5.4)
LYMPHOCYTES NFR BLD AUTO: 27.8 % (ref 13.4–35)
MCH RBC QN AUTO: 33 PG (ref 28–32)
MCHC RBC AUTO-ENTMCNC: 33 % (ref 30–34)
MCV RBC AUTO: 98 FL (ref 79–97)
MONOCYTES # (AUTO): 0.3 K/MM3 (ref 0–0.8)
MONOCYTES % (AUTO): 5.2 % (ref 0–7.3)
MUCOUS THREADS #/AREA URNS HPF: (no result) /HPF
PH UR STRIP: 5 [PH] (ref 5–7)
PLATELET # BLD: 256 K/MM3 (ref 140–440)
PROT UR STRIP-MCNC: (no result) MG/DL
RBC # BLD AUTO: 4.04 M/MM3 (ref 3.65–5.03)
RBC #/AREA URNS HPF: < 1 /HPF (ref 0–6)
UROBILINOGEN UR-MCNC: < 2 MG/DL (ref ?–2)
WBC #/AREA URNS HPF: < 1 /HPF (ref 0–6)

## 2018-06-21 PROCEDURE — 71046 X-RAY EXAM CHEST 2 VIEWS: CPT

## 2018-06-21 PROCEDURE — 81001 URINALYSIS AUTO W/SCOPE: CPT

## 2018-06-21 PROCEDURE — 85025 COMPLETE CBC W/AUTO DIFF WBC: CPT

## 2018-06-21 PROCEDURE — 93005 ELECTROCARDIOGRAM TRACING: CPT

## 2018-06-21 PROCEDURE — 36415 COLL VENOUS BLD VENIPUNCTURE: CPT

## 2018-06-21 PROCEDURE — 93010 ELECTROCARDIOGRAM REPORT: CPT

## 2018-06-21 PROCEDURE — 80048 BASIC METABOLIC PNL TOTAL CA: CPT

## 2018-06-21 PROCEDURE — 84484 ASSAY OF TROPONIN QUANT: CPT

## 2018-06-28 ENCOUNTER — HOSPITAL ENCOUNTER (EMERGENCY)
Dept: HOSPITAL 5 - ED | Age: 43
Discharge: LEFT BEFORE DISCHARGE | End: 2018-06-28
Payer: MEDICARE

## 2018-06-28 VITALS — SYSTOLIC BLOOD PRESSURE: 128 MMHG | DIASTOLIC BLOOD PRESSURE: 85 MMHG

## 2018-06-28 DIAGNOSIS — M79.602: Primary | ICD-10-CM

## 2018-06-28 DIAGNOSIS — Z53.21: ICD-10-CM

## 2018-06-28 LAB
BASOPHILS # (AUTO): 0.1 K/MM3 (ref 0–0.1)
BASOPHILS NFR BLD AUTO: 0.9 % (ref 0–1.8)
BILIRUB UR QL STRIP: (no result)
BLOOD UR QL VISUAL: (no result)
EOSINOPHIL # BLD AUTO: 0.2 K/MM3 (ref 0–0.4)
EOSINOPHIL NFR BLD AUTO: 2.7 % (ref 0–4.3)
HCT VFR BLD CALC: 38.6 % (ref 30.3–42.9)
HGB BLD-MCNC: 13.1 GM/DL (ref 10.1–14.3)
LYMPHOCYTES # BLD AUTO: 2.2 K/MM3 (ref 1.2–5.4)
LYMPHOCYTES NFR BLD AUTO: 31.9 % (ref 13.4–35)
MCH RBC QN AUTO: 32 PG (ref 28–32)
MCHC RBC AUTO-ENTMCNC: 34 % (ref 30–34)
MCV RBC AUTO: 95 FL (ref 79–97)
MONOCYTES # (AUTO): 0.4 K/MM3 (ref 0–0.8)
MONOCYTES % (AUTO): 5.6 % (ref 0–7.3)
MUCOUS THREADS #/AREA URNS HPF: (no result) /HPF
PH UR STRIP: 5 [PH] (ref 5–7)
PLATELET # BLD: 285 K/MM3 (ref 140–440)
PROT UR STRIP-MCNC: (no result) MG/DL
RBC # BLD AUTO: 4.05 M/MM3 (ref 3.65–5.03)
RBC #/AREA URNS HPF: 2 /HPF (ref 0–6)
UROBILINOGEN UR-MCNC: < 2 MG/DL (ref ?–2)
WBC #/AREA URNS HPF: 1 /HPF (ref 0–6)

## 2018-06-28 PROCEDURE — 86850 RBC ANTIBODY SCREEN: CPT

## 2018-06-28 PROCEDURE — 84702 CHORIONIC GONADOTROPIN TEST: CPT

## 2018-06-28 PROCEDURE — 86901 BLOOD TYPING SEROLOGIC RH(D): CPT

## 2018-06-28 PROCEDURE — 85025 COMPLETE CBC W/AUTO DIFF WBC: CPT

## 2018-06-28 PROCEDURE — 81001 URINALYSIS AUTO W/SCOPE: CPT

## 2018-06-28 PROCEDURE — 86900 BLOOD TYPING SEROLOGIC ABO: CPT

## 2018-06-28 PROCEDURE — 36415 COLL VENOUS BLD VENIPUNCTURE: CPT

## 2018-06-28 PROCEDURE — 81025 URINE PREGNANCY TEST: CPT

## 2018-07-15 ENCOUNTER — HOSPITAL ENCOUNTER (EMERGENCY)
Dept: HOSPITAL 5 - ED | Age: 43
LOS: 1 days | Discharge: HOME | End: 2018-07-16
Payer: MEDICARE

## 2018-07-15 VITALS — SYSTOLIC BLOOD PRESSURE: 118 MMHG | DIASTOLIC BLOOD PRESSURE: 55 MMHG

## 2018-07-15 DIAGNOSIS — M25.562: Primary | ICD-10-CM
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
## 2018-07-15 DIAGNOSIS — J45.909: ICD-10-CM
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
## 2018-07-15 DIAGNOSIS — M25.572: ICD-10-CM
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
## 2018-07-15 PROCEDURE — 96372 THER/PROPH/DIAG INJ SC/IM: CPT

## 2018-07-15 PROCEDURE — 99284 EMERGENCY DEPT VISIT MOD MDM: CPT

## 2018-07-15 PROCEDURE — 73562 X-RAY EXAM OF KNEE 3: CPT

## 2018-07-15 PROCEDURE — 73600 X-RAY EXAM OF ANKLE: CPT

## 2018-07-27 ENCOUNTER — HOSPITAL ENCOUNTER (EMERGENCY)
Dept: HOSPITAL 5 - ED | Age: 43
Discharge: HOME | End: 2018-07-27
Payer: MEDICARE

## 2018-07-27 VITALS — SYSTOLIC BLOOD PRESSURE: 127 MMHG | DIASTOLIC BLOOD PRESSURE: 80 MMHG
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
## 2018-07-27 DIAGNOSIS — Z88.5: ICD-10-CM

## 2018-07-27 DIAGNOSIS — M25.572: Primary | ICD-10-CM
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
## 2018-07-27 DIAGNOSIS — M25.562: ICD-10-CM

## 2018-07-27 DIAGNOSIS — J45.909: ICD-10-CM

## 2018-07-27 PROCEDURE — 99283 EMERGENCY DEPT VISIT LOW MDM: CPT

## 2018-07-27 PROCEDURE — 73562 X-RAY EXAM OF KNEE 3: CPT

## 2018-07-27 PROCEDURE — 96372 THER/PROPH/DIAG INJ SC/IM: CPT

## 2018-07-27 PROCEDURE — 73610 X-RAY EXAM OF ANKLE: CPT

## 2018-07-27 NOTE — XRAY REPORT
FINAL REPORT



EXAM:  XR ANKLE 3+V LT



HISTORY:  left ankle injury with pain and swelling 



TECHNIQUE:  3 views of left ankle.



PRIORS:  Mid 18 June 2018.



FINDINGS:  

No apparent fracture or dislocation. 



Ankle mortise maintained.  



Soft tissues grossly unremarkable. 



IMPRESSION:  

1. No acute osseous abnormality.

## 2018-07-27 NOTE — EMERGENCY DEPARTMENT REPORT
ED Lower Extremity HPI





- General


Chief Complaint: Extremity Injury, Lower


Stated Complaint: LEFT LEG PAIN


Time Seen by Provider: 18 14:26


Source: patient, family


Mode of arrival: Wheelchair


Limitations: No Limitations





- History of Present Illness


Initial Comments: 





This is a 42-year-old female who reported that she injured her left ankle and 

left knee.  She says she accidentally slipped and fell and hit her knee and 

ankle on the floor.  She says she had previously had ankle sprain and records 

reflect that she was here in 2018 for x-ray of ankle and had a sprain.  

Denies any numbness or tingling to extremities.  Denies any shortness of breath 

or chest pain.  Denies any calf pain.  Patient is ambulatory in the room.  She 

says she took Aleve and Advil for pain but it did not help her pain.  Pain is 8 

out of 10 to left ankle and left knee.  Worse with movement and no alleviating 

factors.  She is here to be treated





MD Complaint: knee injury, ankle injury


-: This morning


Injury: Knee: Left (pain), Ankle: Left (pain)


Type of Injury: eversion


Place: home


Severity: severe


Severity scale (0 -10): 8


Improves With: NSAID


Worsens With: weight bearing, movement, palpation


Context: fall, direct blow


Associated Symptoms: swelling, able to partially bear weight.  denies: snap/pop 

sensation, numbness, tingling, unable to bear weight, ambulatory


Treatments Prior to Arrival: other (patient stable no distress.)





- Related Data


 Previous Rx's











 Medication  Instructions  Recorded  Last Taken  Type


 


Ibuprofen [Motrin] 600 mg PO Q8H PRN #30 tablet 18 Unknown Rx


 


Ibuprofen [Motrin 800 MG tab] 800 mg PO Q8HR PRN #15 tablet 18 Unknown Rx


 


Ondansetron [Zofran ODT TAB] 8 mg PO Q8HR PRN #15 tab.rapdis 18 Unknown Rx











 Allergies











Allergy/AdvReac Type Severity Reaction Status Date / Time


 


codeine Allergy  Nausea Verified 18 20:10














ED Review of Systems


ROS: 


Stated complaint: LEFT LEG PAIN


Other details as noted in HPI





Constitutional: denies: chills, fever


Eyes: denies: eye pain, eye discharge, vision change


ENT: denies: ear pain, throat pain


Respiratory: denies: cough, shortness of breath, SOB with exertion, SOB at rest

, stridor, wheezing


Cardiovascular: denies: chest pain, palpitations, edema, syncope


Gastrointestinal: denies: abdominal pain, nausea, vomiting, diarrhea


Musculoskeletal: joint swelling.  denies: back pain, myalgia


Skin: denies: rash, lesions


Neurological: denies: headache, weakness, numbness, paresthesias, abnormal gait

, vertigo


Psychiatric: anxiety





ED Past Medical Hx





- Past Medical History


Previous Medical History?: Yes


Hx Asthma: Yes





- Surgical History


Past Surgical History?: Yes


Additional Surgical History: knee





- Family History


Family history: hypertension





- Social History


Smoking Status: Never Smoker


Substance Use Type: None





- Medications


Home Medications: 


 Home Medications











 Medication  Instructions  Recorded  Confirmed  Last Taken  Type


 


Ibuprofen [Motrin] 600 mg PO Q8H PRN #30 tablet 18  Unknown Rx


 


Ibuprofen [Motrin 800 MG tab] 800 mg PO Q8HR PRN #15 tablet 18  Unknown Rx


 


Ondansetron [Zofran ODT TAB] 8 mg PO Q8HR PRN #15 tab.rapdis 18  Unknown 

Rx














ED Physical Exam





- General


Limitations: No Limitations


General appearance: alert, in no apparent distress





- Head


Head exam: Present: atraumatic, normocephalic, normal inspection





- Eye


Eye exam: Present: normal appearance, PERRL, EOMI


Pupils: Present: normal accommodation





- ENT


ENT exam: Present: normal exam, normal orophraynx, mucous membranes moist, TM's 

normal bilaterally, normal external ear exam





- Neck


Neck exam: Present: normal inspection, full ROM, other (no C-spine tenderness).

  Absent: tenderness, meningismus, lymphadenopathy





- Respiratory


Respiratory exam: Present: normal lung sounds bilaterally.  Absent: respiratory 

distress, chest wall tenderness





- Cardiovascular


Cardiovascular Exam: Present: regular rate, normal rhythm, normal heart sounds.

  Absent: systolic murmur, diastolic murmur





- GI/Abdominal


GI/Abdominal exam: Present: soft, normal bowel sounds.  Absent: distended, 

tenderness, guarding, rebound, rigid





- Extremities Exam


Extremities exam: Present: normal inspection, full ROM, tenderness (tentative 

palpates the left knee and left ankle without any swelling), normal capillary 

refill, other (No cce. + 2 pulses in all extremities, no neurovascular 

compromise).  Absent: pedal edema, joint swelling, calf tenderness





- Expanded Lower Extremity Exam


  ** Left


Hip exam: Present: normal inspection, full ROM, pelvic stability.  Absent: 

tenderness, swelling, abrasion, laceration, ecchymosis, deformity, crepidus, 

dislocation, erythema, external rotation, internal rotation, shortening


Upper Leg exam: Present: normal inspection, full ROM.  Absent: tenderness, 

swelling, abrasion, laceration, ecchymosis, deformity, crepidus, dislocation, 

erythema


Knee exam: Present: normal inspection, full ROM (reports pain with flexion and 

extension), tenderness, full knee extension.  Absent: swelling, abrasion, 

laceration, ecchymosis, deformity, crepidus, dislocation, erythema, effusion, 

pain w/ pronation/supination, posterior draw sign, pain/laxity with valgus, pain

/laxity with varus


Lower Leg exam: Present: normal inspection, full ROM.  Absent: tenderness, 

swelling, abrasion, laceration, ecchymosis, deformity, crepidus, dislocation, 

erythema, palpable cord, Kp's sign


Foot/Toe exam: Present: normal inspection, full ROM (pain with dorsiflexion), 

tenderness (tentative palpated left malleolus ankle).  Absent: swelling, 

abrasion, laceration, ecchymosis, deformity, crepidus, dislocation, erythema, 

amputation, puncture wound, foreign body, calcaneal tenderness, tenderness at 

base of 5th metatarsal, nail avulsion, subungual hematoma


Neuro vascular tendon exam: Present: no vascular compromise.  Absent: pulse 

deficit, abnormal cap refill, motor deficit, sensory deficit, pallor, abnormal 2

-point discrimination, decreased fine/light touch, foot drop, peroneal nerve 

deficit, significant pain with passive ROM of distal joint


Gait: Positive: observed and limited by pain





- Back Exam


Back exam: Present: normal inspection, full ROM, other (ambulates without any 

difficulties).  Absent: tenderness, CVA tenderness (R), CVA tenderness (L), 

muscle spasm, paraspinal tenderness, vertebral tenderness, rash noted





- Neurological Exam


Neurological exam: Present: alert, oriented X3, normal gait, reflexes normal, 

other (no focal neurological deficit).  Absent: motor sensory deficit





- Psychiatric


Psychiatric exam: Present: normal affect, normal mood





- Skin


Skin exam: Present: warm, dry, intact, normal color.  Absent: rash





ED Course


 Vital Signs











  18





  12:57


 


Temperature 98.3 F


 


Pulse Rate 72


 


Respiratory 16





Rate 


 


Blood Pressure 121/68


 


O2 Sat by Pulse 97





Oximetry 














- Reevaluation(s)


Reevaluation #1: 





18 15:59


Patient given Percocet 5/2 tablets by mouth, Benadryl 50 mg by mouth because 

she says she gets itching from Percocet she has taken in the past.  Motrin 800 

mg by mouth for left ankle and foot pain after twisting.  She reports that she 

examination after getting medication.  She was given Decadron 10 mg IM and 

reevaluated.  She is localizes chin and no respiratory system involvement and 

her oral airways patent without any swelling of her face, lips or tongue.  

Awaiting x-ray results








Reevaluation #2: 





18 17:16


Itching has released status post Decadron 10 mg IM.





ED Lower Extremity MDM





- EKG Data





18 17:09


This is a 42-year-old female who reported that she injured her left ankle and 

left knee.  She says she accidentally slipped and fell and hit her knee and 

ankle on the floor.  She says she had previously had ankle sprain and records 

reflect that she was here in 2018 for x-ray of ankle and had a sprain.  

Denies any numbness or tingling to extremities.  Denies any shortness of breath 

or chest pain.  Denies any calf pain.  Patient is ambulatory in the room.  She 

says she took Aleve and Advil for pain but it did not help her pain.  She is 

here to be treated.  Pain is 8 out of 10 and worse movement no alleviating 

factors.





- Radiology Data


Radiology results: report reviewed


Patient had x-ray 3 views left ankle and left knee which was dictated by 

radiologist and reviewed by myself .  See below for details on report.





Patient: RIDDHI CRAWFORD MR#: [id number] 


: 1975 Acct:[id number] 


Age/Sex: 42 / F ADM Date: 18 


Loc: ED 


Attending Dr: 








Ordering Physician: ADILSON VELASCO 


Date of Service: 18 


Procedure(s): XR knee 3V LT 


Accession Number(s): [id number] 





cc: ADILSON VELASCO 





Fluoro Time In Minutes: 





FINAL REPORT 





EXAM: XR KNEE 3V LT 





HISTORY: left knee injury with pain 





TECHNIQUE: 3 views of left knee. 





PRIORS: 2018. 





FINDINGS: 


No apparent fracture or dislocation. 





Joint spaces maintained. 





Soft tissues grossly unremarkable. 





IMPRESSION: 


1. No acute osseous abnormality. 











Transcribed By: [facility] 


Dictated By: CAITY OLIVEIRA MD 


Electronically Authenticated By: CAITY OLIVEIRA MD 


Signed Date/Time: 18 











DD/DT: 18 


TD/TT: 18





Patient: RIDDHI CRAWFORD MR#: [id number] 


: 1975 Acct:[id number] 


Age/Sex: 42 / F ADM Date: 18 


Loc: ED 


Attending Dr: 








Ordering Physician: ADILSON VELASCO 


Date of Service: 18 


Procedure(s): XR ankle 3+V LT 


Accession Number(s): [id number] 





cc: ADILSON VELASCO 





Fluoro Time In Minutes: 





FINAL REPORT 





EXAM: XR ANKLE 3+V LT 





HISTORY: left ankle injury with pain and swelling 





TECHNIQUE: 3 views of left ankle. 





PRIORS: Mid 2018. 





FINDINGS: 


No apparent fracture or dislocation. 





Ankle mortise maintained. 





Soft tissues grossly unremarkable. 





IMPRESSION: 


1. No acute osseous abnormality. 











Transcribed By: [facility] 


Dictated By: CAITY OLIVEIRA MD 


Electronically Authenticated By: CATIY OLIVEIRA MD 


Signed Date/Time: 18 











DD/DT: 18 


TD/TT: 18





- Medical Decision Making





This is a 42-year-old female who reported that she injured her left ankle and 

left knee.  She says she accidentally slipped and fell and hit her knee and 

ankle on the floor.  She still has pain despite Aleve and Advil.  She is here 

to be evaluated





Patient was examined by myself and she was found to have left ankle malleolus 

tender to palpate without swelling  She has pain on dorsiflexion.  Pedal pulses 

are normal bilaterally and bounding.  She has good color, sensation, movement 

and temperature all extremities.  Her left knee without swelling with 

tenderness to palpate but she is able to flex and extend fully without any 

difficulties.  She does report pain range of motion otherwise all exams are 

normal.  Patient had x-ray of left knee 3 views and x-ray of left ankle.  That 

was dictated by radiologist and report was reviewed by myself.  Patient has 

negative findings on knee and ankle x-ray.  Discussed results of x-ray with 

patient and she voiced understanding.  Her pain is controlled with pain 

medication.





Arthralgia multiple sites to include left knee and left ankle status post fall-

given Percocet 5/325 2 tablets by mouth for pain which relieved her pain but 

then she developed itching due to allergies and was given Benadryl 50 mg by 

mouth and Decadron 10 mg IM which relieved the itching.  She had no 

anaphylactic .  She was also given Motrin 800 mg for pain and her pain has been 

relieved.  We will discharge home on Motrin and Tylenol No. 3.





Patient educated on Rice therapy, discharge instruction, medication, diagnosis 

and x-ray findings and she voiced understanding.





Patient discharged home with her friend in stable condition.  Vital signs are 

stable she is afebrile and discharged home to follow-up with orthopedics and 

her primary care physician and 3 days and she voiced understanding of this.  

Discharged home with prescription for Motrin and Zofran.





- Differential Diagnosis


fracture, sprain, strain, musculoskeletal pain


Critical care attestation.: 


If time is entered above; I have spent that time in minutes in the direct care 

of this critically ill patient, excluding procedure time.








ED Disposition


Clinical Impression: 


 Arthralgia of multiple joints





Accidental fall


Qualifiers:


 Encounter type: initial encounter Qualified Code(s): W19.XXXA - Unspecified 

fall, initial encounter





Disposition:  TO HOME OR SELFCARE


Is pt being admited?: No


Does the pt Need Aspirin: No


Condition: Stable


Instructions:  Knee Pain (ED), Arthralgia (ED), Knee Exercises (GEN), Ankle 

Exercises (GEN), RICE Therapy (ED)


Additional Instructions: 


Follow-up the orthopedic doctor and primary care physician in 3 days.  Have a 

primary care physician follow-up at [facility]


Take Motrin pain and remember to take this medication with food as it can 

irritate his stomach lining


Take Zofran for nausea and/or vomiting.


See Discharge instructions rice therapy


Prescriptions: 


Ibuprofen [Motrin 800 MG tab] 800 mg PO Q8HR PRN #15 tablet


 PRN Reason: Pain


Ondansetron [Zofran ODT TAB] 8 mg PO Q8HR PRN #15 tab.rapdis


 PRN Reason: Nausea And Vomiting


Referrals: 


PRIMARY CARE,MD [Primary Care Provider] - 18


[facility] [Outside] - 18


ROSSANA DENTON MD [Staff Physician] - 3-5 Days


Forms:  Accompanied Note, Work/School Release Form(ED)

## 2018-07-27 NOTE — XRAY REPORT
FINAL REPORT



EXAM:  XR KNEE 3V LT



HISTORY:  left knee injury with pain 



TECHNIQUE:  3 views of left knee. 



PRIORS:  18 June 2018.



FINDINGS:  

No apparent fracture or dislocation. 



Joint spaces maintained.  



Soft tissues grossly unremarkable. 



IMPRESSION:  

1. No acute osseous abnormality.

## 2019-04-04 ENCOUNTER — HOSPITAL ENCOUNTER (EMERGENCY)
Dept: HOSPITAL 5 - ED | Age: 44
Discharge: LEFT BEFORE DISCHARGE | End: 2019-04-04
Payer: MEDICARE

## 2019-04-04 VITALS — DIASTOLIC BLOOD PRESSURE: 61 MMHG | SYSTOLIC BLOOD PRESSURE: 118 MMHG

## 2019-04-04 DIAGNOSIS — R07.9: Primary | ICD-10-CM

## 2019-04-04 DIAGNOSIS — Z53.21: ICD-10-CM

## 2019-04-04 PROCEDURE — 93005 ELECTROCARDIOGRAM TRACING: CPT

## 2019-04-04 PROCEDURE — 93010 ELECTROCARDIOGRAM REPORT: CPT

## 2019-04-07 ENCOUNTER — HOSPITAL ENCOUNTER (EMERGENCY)
Dept: HOSPITAL 5 - ED | Age: 44
Discharge: HOME | End: 2019-04-07
Payer: MEDICARE

## 2019-04-07 VITALS — DIASTOLIC BLOOD PRESSURE: 75 MMHG | SYSTOLIC BLOOD PRESSURE: 119 MMHG

## 2019-04-07 DIAGNOSIS — B86: Primary | ICD-10-CM

## 2019-04-07 DIAGNOSIS — F31.9: ICD-10-CM

## 2019-04-07 DIAGNOSIS — J45.909: ICD-10-CM

## 2019-04-07 PROCEDURE — 99282 EMERGENCY DEPT VISIT SF MDM: CPT

## 2019-04-09 ENCOUNTER — HOSPITAL ENCOUNTER (EMERGENCY)
Dept: HOSPITAL 5 - ED | Age: 44
Discharge: LEFT BEFORE DISCHARGE | End: 2019-04-09
Payer: MEDICARE

## 2019-04-09 VITALS — DIASTOLIC BLOOD PRESSURE: 45 MMHG | SYSTOLIC BLOOD PRESSURE: 135 MMHG

## 2019-04-09 VITALS — DIASTOLIC BLOOD PRESSURE: 51 MMHG | SYSTOLIC BLOOD PRESSURE: 106 MMHG

## 2019-04-09 DIAGNOSIS — Z53.21: ICD-10-CM

## 2019-04-09 DIAGNOSIS — M79.605: Primary | ICD-10-CM

## 2019-04-09 DIAGNOSIS — M25.562: Primary | ICD-10-CM

## 2019-04-14 ENCOUNTER — HOSPITAL ENCOUNTER (EMERGENCY)
Dept: HOSPITAL 5 - ED | Age: 44
Discharge: HOME | End: 2019-04-14
Payer: MEDICARE

## 2019-04-14 VITALS — SYSTOLIC BLOOD PRESSURE: 117 MMHG | DIASTOLIC BLOOD PRESSURE: 78 MMHG

## 2019-04-14 DIAGNOSIS — Z88.6: ICD-10-CM

## 2019-04-14 DIAGNOSIS — J45.909: ICD-10-CM

## 2019-04-14 DIAGNOSIS — G89.29: ICD-10-CM

## 2019-04-14 DIAGNOSIS — H01.001: Primary | ICD-10-CM

## 2019-04-14 PROCEDURE — 99282 EMERGENCY DEPT VISIT SF MDM: CPT

## 2019-04-18 ENCOUNTER — HOSPITAL ENCOUNTER (EMERGENCY)
Dept: HOSPITAL 5 - ED | Age: 44
Discharge: HOME | End: 2019-04-18
Payer: MEDICARE

## 2019-04-18 VITALS — SYSTOLIC BLOOD PRESSURE: 115 MMHG | DIASTOLIC BLOOD PRESSURE: 77 MMHG
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
## 2019-04-18 DIAGNOSIS — S86.912A: Primary | ICD-10-CM
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
## 2019-04-27 ENCOUNTER — HOSPITAL ENCOUNTER (EMERGENCY)
Dept: HOSPITAL 5 - ED | Age: 44
Discharge: HOME | End: 2019-04-27
Payer: MEDICARE

## 2019-04-27 VITALS — DIASTOLIC BLOOD PRESSURE: 73 MMHG | SYSTOLIC BLOOD PRESSURE: 127 MMHG

## 2019-04-27 DIAGNOSIS — Z90.49: ICD-10-CM

## 2019-04-27 DIAGNOSIS — J45.909: ICD-10-CM

## 2019-04-27 DIAGNOSIS — Z88.6: ICD-10-CM

## 2019-04-27 DIAGNOSIS — M79.661: Primary | ICD-10-CM

## 2019-04-27 DIAGNOSIS — Z79.899: ICD-10-CM

## 2019-04-27 DIAGNOSIS — F31.9: ICD-10-CM

## 2019-04-27 DIAGNOSIS — F43.10: ICD-10-CM

## 2019-04-27 PROCEDURE — 99282 EMERGENCY DEPT VISIT SF MDM: CPT

## 2019-05-24 ENCOUNTER — HOSPITAL ENCOUNTER (EMERGENCY)
Dept: HOSPITAL 5 - ED | Age: 44
LOS: 1 days | Discharge: LEFT BEFORE DISCHARGE | End: 2019-05-25
Payer: MEDICARE

## 2019-05-24 DIAGNOSIS — M25.561: Primary | ICD-10-CM

## 2019-05-24 DIAGNOSIS — Z53.21: ICD-10-CM

## 2022-04-19 ENCOUNTER — HOSPITAL ENCOUNTER (EMERGENCY)
Dept: HOSPITAL 5 - ED | Age: 47
LOS: 1 days | Discharge: HOME | End: 2022-04-20
Payer: MEDICARE
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
## 2022-04-19 DIAGNOSIS — S86.912A: Primary | ICD-10-CM
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
## 2022-04-19 PROCEDURE — 99284 EMERGENCY DEPT VISIT MOD MDM: CPT

## 2022-04-20 VITALS — DIASTOLIC BLOOD PRESSURE: 74 MMHG | SYSTOLIC BLOOD PRESSURE: 116 MMHG
